# Patient Record
Sex: MALE | Race: WHITE | NOT HISPANIC OR LATINO | URBAN - METROPOLITAN AREA
[De-identification: names, ages, dates, MRNs, and addresses within clinical notes are randomized per-mention and may not be internally consistent; named-entity substitution may affect disease eponyms.]

---

## 2018-05-28 ENCOUNTER — EMERGENCY (EMERGENCY)
Facility: HOSPITAL | Age: 28
LOS: 1 days | Discharge: HOME | End: 2018-05-28
Attending: EMERGENCY MEDICINE
Payer: MEDICAID

## 2018-05-28 VITALS
OXYGEN SATURATION: 99 % | DIASTOLIC BLOOD PRESSURE: 87 MMHG | SYSTOLIC BLOOD PRESSURE: 129 MMHG | RESPIRATION RATE: 18 BRPM | HEART RATE: 78 BPM

## 2018-05-28 VITALS
TEMPERATURE: 98 F | OXYGEN SATURATION: 97 % | SYSTOLIC BLOOD PRESSURE: 117 MMHG | RESPIRATION RATE: 18 BRPM | DIASTOLIC BLOOD PRESSURE: 76 MMHG | HEART RATE: 72 BPM

## 2018-05-28 PROCEDURE — 93308 TTE F-UP OR LMTD: CPT

## 2018-05-28 PROCEDURE — 71046 X-RAY EXAM CHEST 2 VIEWS: CPT | Mod: 26

## 2018-05-28 PROCEDURE — 94640 AIRWAY INHALATION TREATMENT: CPT

## 2018-05-28 PROCEDURE — 99285 EMERGENCY DEPT VISIT HI MDM: CPT | Mod: 25

## 2018-05-28 PROCEDURE — 93010 ELECTROCARDIOGRAM REPORT: CPT

## 2018-05-28 PROCEDURE — 99284 EMERGENCY DEPT VISIT MOD MDM: CPT | Mod: 25

## 2018-05-28 PROCEDURE — 93005 ELECTROCARDIOGRAM TRACING: CPT

## 2018-05-28 PROCEDURE — 71046 X-RAY EXAM CHEST 2 VIEWS: CPT

## 2018-05-28 RX ORDER — AMOXICILLIN 250 MG/5ML
0 SUSPENSION, RECONSTITUTED, ORAL (ML) ORAL
Qty: 0 | Refills: 0 | COMMUNITY

## 2018-05-28 RX ORDER — IBUPROFEN 200 MG
600 TABLET ORAL ONCE
Qty: 0 | Refills: 0 | Status: COMPLETED | OUTPATIENT
Start: 2018-05-28 | End: 2018-05-28

## 2018-05-28 RX ORDER — IPRATROPIUM/ALBUTEROL SULFATE 18-103MCG
3 AEROSOL WITH ADAPTER (GRAM) INHALATION ONCE
Qty: 0 | Refills: 0 | Status: COMPLETED | OUTPATIENT
Start: 2018-05-28 | End: 2018-05-28

## 2018-05-28 RX ADMIN — Medication 600 MILLIGRAM(S): at 20:52

## 2018-05-28 RX ADMIN — Medication 3 MILLILITER(S): at 21:57

## 2018-05-28 RX ADMIN — Medication 200 MILLIGRAM(S): at 22:27

## 2018-05-28 NOTE — ED ADULT NURSE NOTE - CHPI ED SYMPTOMS NEG
no diaphoresis/no body aches/no chills/no headache/no wheezing/no hemoptysis/no shortness of breath/no edema/no fever

## 2018-05-28 NOTE — ED PROVIDER NOTE - OBJECTIVE STATEMENT
26 y/o male no pmhx who presents to the ED for Chest pain. patient reports cough and cold symptoms starting about 10 days ago, went to  and was started on zpack, wasn't feeling improved so at the end of the course returned and was placed on amoxicillin. Patient reports continued congestion and productive cough with clear phlegm. no fever/chills. no recent travel or sick contacts. today developed bilaterl anterior chest pain medially, chest ttp and reports SOB because it is painful to breath

## 2018-05-28 NOTE — ED PROVIDER NOTE - CARDIAC, MLM
Normal rate, regular rhythm.  Heart sounds S1, S2.  No murmurs, rubs or gallops. anterior chest wall ttp over intercostals

## 2018-05-28 NOTE — ED PROVIDER NOTE - ATTENDING CONTRIBUTION TO CARE
28 yo M presents with 1 week of congestion and cough. initially productive of green phlegm was placed on a zpack with improvement of his cough. cough recurred on saturday, productive of white phlegm. he was started on augmentin by his PMD. today he started developing burning/sharp chest wall pain with coughing. and sob, rated 5/10 at this time. cp is not pleuritic. cp/sob not exertional or positional (lying down/sitting up doesn't influence pain). no orthopnea/pnd, no leg swelling, no abd pain.   PE lungs CTA. anteiror chest wall over sternum and chrostochonral cartilages is TTP. 28 yo M presents with 1 week of congestion and cough. initially productive of green phlegm was placed on a zpack with improvement of his cough. cough recurred on saturday, productive of white phlegm. he was started on augmentin by his PMD. today he started developing burning/sharp chest wall pain with coughing. and sob, rated 5/10 at this time. cp is not pleuritic. cp/sob not exertional or positional (lying down/sitting up doesn't influence pain). no orthopnea/pnd, no leg swelling, no abd pain. no PE risk factors. no ACS risk factors.  PE lungs CTA. anteiror chest wall over sternum and chrostochonral cartilages is TTP.  VS stable. PERC negative.   ed workup with EKG with nonspecific changes, no stemi, no pericarditis. CXR With NAD. Echo with no effusion. patient treated with 1 duoneb and toradol in the ER. on pt re-eval he reported significant improvement in his symptoms and resolution of sob. he was observed ambulating around the ER without any sob. pt discharged with instructions to rest, motrin/tylenol for pain, and f/u with pmd in 1-2 days for repeat evla/further management    The patient was discharged from the ED in stable condition. All results of today's workup were discussed with the patient and all questions/concerns were addressed. All discharge instructions were thoroughly discussed with the patient, as well as important warning signs and new/ worsening symptoms which should necessitate patient's immediate return to the ED. The patient is agreeable with discharge and expresses full understanding of all instructions given.

## 2018-05-28 NOTE — ED PROVIDER NOTE - PLAN OF CARE
Follow up with your Primary Care Physician within the next 2-3 days  Bring a copy of your test results with you to your appointment  Continue your current medication regimen  Return to the Emergency Room if you experience new or worsening symptoms  stay hydrated  Take Motrin 400-600mg every 6 hrs as needed for pain. Take with food  Take Tessalon Perles 3x per day as needed for cough

## 2018-05-28 NOTE — ED PROVIDER NOTE - CARE PLAN
Principal Discharge DX:	URI (upper respiratory infection)  Assessment and plan of treatment:	Follow up with your Primary Care Physician within the next 2-3 days  Bring a copy of your test results with you to your appointment  Continue your current medication regimen  Return to the Emergency Room if you experience new or worsening symptoms  stay hydrated  Take Motrin 400-600mg every 6 hrs as needed for pain. Take with food  Take Tessalon Perles 3x per day as needed for cough

## 2018-05-28 NOTE — ED PROVIDER NOTE - PROGRESS NOTE DETAILS
patient reports no improvement with the duoneb. will obtain echo to rule out pericardial effusion. patient well  appearing and with no increased work of breathing.

## 2018-05-28 NOTE — ED ADULT NURSE NOTE - OBJECTIVE STATEMENT
pt is a 27 yr M presents with cough and chest pain since x 10 days. pt was started on z-pack last week without relief. pt was started on amoxicillin Saturday. cough is non-productive, chest pain is midsternal, reproducible. no fever/chills/sob. VSS.

## 2018-12-04 ENCOUNTER — EMERGENCY (EMERGENCY)
Facility: HOSPITAL | Age: 28
LOS: 1 days | Discharge: HOME | End: 2018-12-04
Attending: EMERGENCY MEDICINE | Admitting: EMERGENCY MEDICINE
Payer: MEDICAID

## 2018-12-04 VITALS
HEART RATE: 67 BPM | DIASTOLIC BLOOD PRESSURE: 73 MMHG | TEMPERATURE: 98 F | OXYGEN SATURATION: 100 % | SYSTOLIC BLOOD PRESSURE: 130 MMHG | RESPIRATION RATE: 16 BRPM

## 2018-12-04 PROCEDURE — 99283 EMERGENCY DEPT VISIT LOW MDM: CPT

## 2018-12-04 NOTE — ED ADULT TRIAGE NOTE - CHIEF COMPLAINT QUOTE
Pt. w/ hx. removal cyst on buttock a few years ago c/o puss draining from umbilicus. Pt. states he noticed foul smell from umbilicus, and puss is only noted when abdomen is squeezed. Pt. appears in NAD , denies any abdominal pain. No redness noted .

## 2018-12-05 RX ORDER — AZTREONAM 2 G
1 VIAL (EA) INJECTION
Qty: 9 | Refills: 0 | OUTPATIENT
Start: 2018-12-05 | End: 2018-12-09

## 2018-12-05 RX ADMIN — Medication 1 TABLET(S): at 01:52

## 2018-12-05 NOTE — ED PROVIDER NOTE - ATTENDING CONTRIBUTION TO CARE
I performed a face-to-face evaluation of the patient and performed a history and physical examination. I agree with the history and physical examination.    Marika: Young man, 2nd episode of umbilicus cellulitis w/ pus. No e/o abscess. Consider persistent urachus. Bactrim. Refer to urology.

## 2018-12-05 NOTE — ED PROVIDER NOTE - NSFOLLOWUPINSTRUCTIONS_ED_ALL_ED_FT
See your primary care doctor within 24-48 hours. Follow up with a urology for evaluation of a possible  persistent urachus or further evaluation, bring copies of all reports with you. Take Bactrim (antibiotic) 1 tab twice a day for 5 days. Return to the ER for worsening symptoms or any other concerns.

## 2018-12-05 NOTE — ED PROVIDER NOTE - MEDICAL DECISION MAKING DETAILS
Marika: Young man, 2nd episode of umbilicus cellulitis w/ pus. No e/o abscess. Consider persistent urachus. Bactrim. Refer to urology.

## 2018-12-05 NOTE — ED PROVIDER NOTE - OBJECTIVE STATEMENT
29 y/o M with no significant PMHx presents to ED with c/o umbilical pus today. Pt states that he woke up to foul odor coming from umbilical area. Soon after pt proceeded to squeeze it and pus drained from umbilicus. Pt reported tenderness earlier, but no pain. Pt has a history of abscess, with the last one being pyelo nephro. Pt denies any fever, chills , vomiting, or anorexia. 29 y/o M with no significant PMHx presents to ED with c/o pus coming out of his umbilicus today. Pt states that he woke up to foul odor coming from umbilical area. Soon after pt proceeded to squeeze it and pus drained from umbilicus. Pt reported tenderness earlier, but none now. Pt has a history of abscesses, with the last one being a pilonidal. Pt denies any fever, chills , vomiting, or anorexia.

## 2018-12-05 NOTE — ED ADULT NURSE NOTE - OBJECTIVE STATEMENT
Pt A&Ox3, pt reports no pain or discomfort at this time. ABD soft and non-tender. No pus or drainage noted from umbilicus at this time. Pt appears in NAD.

## 2018-12-05 NOTE — ED ADULT NURSE NOTE - NSIMPLEMENTINTERV_GEN_ALL_ED
Implemented All Universal Safety Interventions:  [place] to call system. Call bell, personal items and telephone within reach. Instruct patient to call for assistance. Room bathroom lighting operational. Non-slip footwear when patient is off stretcher. Physically safe environment: no spills, clutter or unnecessary equipment. Stretcher in lowest position, wheels locked, appropriate side rails in place.

## 2018-12-05 NOTE — ED PROVIDER NOTE - PHYSICAL EXAMINATION
Skin: Mild erythema to 11 o clock position of inner umbilicus. Skin: Mild erythema to 11 o clock position of inner umbilicus, no palpable tenderness/fluctuance  or mass, no active drainage from umbilicus

## 2019-08-23 ENCOUNTER — TRANSCRIPTION ENCOUNTER (OUTPATIENT)
Age: 29
End: 2019-08-23

## 2019-08-24 ENCOUNTER — EMERGENCY (EMERGENCY)
Facility: HOSPITAL | Age: 29
LOS: 1 days | Discharge: HOME | End: 2019-08-24
Admitting: EMERGENCY MEDICINE
Payer: MEDICAID

## 2019-08-24 ENCOUNTER — TRANSCRIPTION ENCOUNTER (OUTPATIENT)
Age: 29
End: 2019-08-24

## 2019-08-24 VITALS
OXYGEN SATURATION: 100 % | RESPIRATION RATE: 18 BRPM | SYSTOLIC BLOOD PRESSURE: 127 MMHG | HEART RATE: 72 BPM | TEMPERATURE: 98 F | DIASTOLIC BLOOD PRESSURE: 73 MMHG

## 2019-08-24 DIAGNOSIS — Z90.49 ACQUIRED ABSENCE OF OTHER SPECIFIED PARTS OF DIGESTIVE TRACT: Chronic | ICD-10-CM

## 2019-08-24 LAB
ALBUMIN SERPL ELPH-MCNC: 4.1 G/DL — SIGNIFICANT CHANGE UP (ref 3.3–5)
ALP SERPL-CCNC: 40 U/L — SIGNIFICANT CHANGE UP (ref 40–120)
ALT FLD-CCNC: 13 U/L — SIGNIFICANT CHANGE UP (ref 4–41)
ANION GAP SERPL CALC-SCNC: 11 MMO/L — SIGNIFICANT CHANGE UP (ref 7–14)
APTT BLD: 28.8 SEC — SIGNIFICANT CHANGE UP (ref 27.5–36.3)
AST SERPL-CCNC: 18 U/L — SIGNIFICANT CHANGE UP (ref 4–40)
BASOPHILS # BLD AUTO: 0.04 K/UL — SIGNIFICANT CHANGE UP (ref 0–0.2)
BASOPHILS NFR BLD AUTO: 0.6 % — SIGNIFICANT CHANGE UP (ref 0–2)
BILIRUB SERPL-MCNC: 0.4 MG/DL — SIGNIFICANT CHANGE UP (ref 0.2–1.2)
BUN SERPL-MCNC: 15 MG/DL — SIGNIFICANT CHANGE UP (ref 7–23)
CALCIUM SERPL-MCNC: 8.8 MG/DL — SIGNIFICANT CHANGE UP (ref 8.4–10.5)
CHLORIDE SERPL-SCNC: 103 MMOL/L — SIGNIFICANT CHANGE UP (ref 98–107)
CO2 SERPL-SCNC: 25 MMOL/L — SIGNIFICANT CHANGE UP (ref 22–31)
CREAT SERPL-MCNC: 0.91 MG/DL — SIGNIFICANT CHANGE UP (ref 0.5–1.3)
EOSINOPHIL # BLD AUTO: 0.08 K/UL — SIGNIFICANT CHANGE UP (ref 0–0.5)
EOSINOPHIL NFR BLD AUTO: 1.3 % — SIGNIFICANT CHANGE UP (ref 0–6)
GLUCOSE SERPL-MCNC: 82 MG/DL — SIGNIFICANT CHANGE UP (ref 70–99)
HCT VFR BLD CALC: 41.6 % — SIGNIFICANT CHANGE UP (ref 39–50)
HGB BLD-MCNC: 13.6 G/DL — SIGNIFICANT CHANGE UP (ref 13–17)
IMM GRANULOCYTES NFR BLD AUTO: 0.3 % — SIGNIFICANT CHANGE UP (ref 0–1.5)
INR BLD: 1.16 — SIGNIFICANT CHANGE UP (ref 0.88–1.17)
LYMPHOCYTES # BLD AUTO: 1.5 K/UL — SIGNIFICANT CHANGE UP (ref 1–3.3)
LYMPHOCYTES # BLD AUTO: 24 % — SIGNIFICANT CHANGE UP (ref 13–44)
MCHC RBC-ENTMCNC: 27.2 PG — SIGNIFICANT CHANGE UP (ref 27–34)
MCHC RBC-ENTMCNC: 32.7 % — SIGNIFICANT CHANGE UP (ref 32–36)
MCV RBC AUTO: 83.2 FL — SIGNIFICANT CHANGE UP (ref 80–100)
MONOCYTES # BLD AUTO: 0.51 K/UL — SIGNIFICANT CHANGE UP (ref 0–0.9)
MONOCYTES NFR BLD AUTO: 8.2 % — SIGNIFICANT CHANGE UP (ref 2–14)
NEUTROPHILS # BLD AUTO: 4.1 K/UL — SIGNIFICANT CHANGE UP (ref 1.8–7.4)
NEUTROPHILS NFR BLD AUTO: 65.6 % — SIGNIFICANT CHANGE UP (ref 43–77)
NRBC # FLD: 0 K/UL — SIGNIFICANT CHANGE UP (ref 0–0)
PLATELET # BLD AUTO: 144 K/UL — LOW (ref 150–400)
PMV BLD: 10.3 FL — SIGNIFICANT CHANGE UP (ref 7–13)
POTASSIUM SERPL-MCNC: 4.4 MMOL/L — SIGNIFICANT CHANGE UP (ref 3.5–5.3)
POTASSIUM SERPL-SCNC: 4.4 MMOL/L — SIGNIFICANT CHANGE UP (ref 3.5–5.3)
PROT SERPL-MCNC: 6.6 G/DL — SIGNIFICANT CHANGE UP (ref 6–8.3)
PROTHROM AB SERPL-ACNC: 13.3 SEC — HIGH (ref 9.8–13.1)
RBC # BLD: 5 M/UL — SIGNIFICANT CHANGE UP (ref 4.2–5.8)
RBC # FLD: 12.8 % — SIGNIFICANT CHANGE UP (ref 10.3–14.5)
SODIUM SERPL-SCNC: 139 MMOL/L — SIGNIFICANT CHANGE UP (ref 135–145)
WBC # BLD: 6.25 K/UL — SIGNIFICANT CHANGE UP (ref 3.8–10.5)
WBC # FLD AUTO: 6.25 K/UL — SIGNIFICANT CHANGE UP (ref 3.8–10.5)

## 2019-08-24 PROCEDURE — 99283 EMERGENCY DEPT VISIT LOW MDM: CPT

## 2019-08-24 PROCEDURE — 73080 X-RAY EXAM OF ELBOW: CPT | Mod: 26,LT

## 2019-08-24 RX ORDER — ACETAMINOPHEN 500 MG
650 TABLET ORAL ONCE
Refills: 0 | Status: COMPLETED | OUTPATIENT
Start: 2019-08-24 | End: 2019-08-24

## 2019-08-24 RX ADMIN — Medication 650 MILLIGRAM(S): at 20:13

## 2019-08-24 NOTE — ED ADULT TRIAGE NOTE - CHIEF COMPLAINT QUOTE
pt has pain to left elbow/ went to urgi and told possible cellulitis/ pts elbow slightly swollen no redness noted

## 2019-08-24 NOTE — ED PROVIDER NOTE - CLINICAL SUMMARY MEDICAL DECISION MAKING FREE TEXT BOX
Pt is a 27 y/o M nonsmoker PMHx appendectomy, pilonidal cyst surgery p/w left elbow pain today -- possible elbow/forearm strain, not clinically concerning for cellulitis -- labs, tylenol, xray elbow

## 2019-08-24 NOTE — ED PROVIDER NOTE - PROGRESS NOTE DETAILS
PA MOSES:  Swelling unchanged.  Pt notes mild improvement in pain.  XR, labs unremarkable.  Pt medically stable for discharge. Pt to follow up with PMD and orthopedics (referral list provided).

## 2019-08-24 NOTE — ED PROVIDER NOTE - NSFOLLOWUPINSTRUCTIONS_ED_ALL_ED_FT
Advance activity as tolerated.  Continue all previously prescribed medications as directed unless otherwise instructed.  Take Tylenol 650mg (Two 325 mg pills) every 4-6 hours as needed for pain or fevers. Apply cool compresses for 15 minutes to affected area, 3-4 times per day. Keep extremity elevated and wrapped.  Apply cool compresses to affected area for 15 minutes, 3-4 times per day.  Follow up with your primary care physician and orthopedics (referral list provided) in 48-72 hours- bring copies of your results.  Return to the ER for worsening or persistent symptoms, including but not limited to worsening/persistent pain, swelling, redness, fevers, inability to bend at elbow, weakness, and/or ANY NEW OR CONCERNING SYMPTOMS. If you have issues obtaining follow up, please call: [phone] to obtain a doctor or specialist who takes your insurance in your area.  You may call [phone] to make an appointment with the internal medicine clinic.

## 2019-08-24 NOTE — ED PROVIDER NOTE - UPPER EXTREMITY EXAM, LEFT
pain with ROM; no pain with axial loading; no redness; no warmth; + swelling and tenderness at dorsolateral aspect of proximal forearm and elbow with appearance of bruising; no crepitus; upper extremity NVI; 2+ radial pulse, < 2 sec capillary refill, sensation intact to light touch; 5/5 strength

## 2019-08-24 NOTE — ED PROVIDER NOTE - OBJECTIVE STATEMENT
Pt is a 29 y/o M nonsmoker PMHx appendectomy, pilonidal cyst surgery p/w left elbow pain today.  Pt states 1 hour after lifting heavy objects, pt developed pain and swelling at dorsolateral aspect of proximal forearm and elbow.  Pt notes pain occurs with ROM and with palpation.  Pt noted gradual swelling, which has been stable for past few hours.  Pt went to urgent care where he was told he had cellulitis and was told to go to ED if he develops redness or worsening pain.  Pt states examiner at urgent care was rough with physical exam and exacerbated, so pt went to ED at this time.  Pt states he is on Amoxicillin for "viral illness" he had yesterday (prescribed by urgent care).  Pt states today viral syndrome resolved, but yesterday he had dry cough, sore throat and he felt tired.  Pt denies any fevers, chills, nausea, vomiting, numbness, weakness, trauma, inability to range at elbow, redness, known insect bites, h/o joint pains elsewhere, h/o STDs (pt states he has never been sexually active), h/o lyme disease, known tick bites, illicit drug use, or any other specific complaints.

## 2020-01-19 ENCOUNTER — TRANSCRIPTION ENCOUNTER (OUTPATIENT)
Age: 30
End: 2020-01-19

## 2021-06-10 NOTE — ED PROVIDER NOTE - DISCHARGE DATE
Otolaryngologist Procedure Text (A): After obtaining clear surgical margins the patient was sent to otolaryngology for surgical repair.  The patient understands they will receive post-surgical care and follow-up from the referring physician's office. 24-Aug-2019

## 2021-12-20 ENCOUNTER — EMERGENCY (EMERGENCY)
Facility: HOSPITAL | Age: 31
LOS: 1 days | Discharge: HOME | End: 2021-12-20
Attending: HOSPITALIST | Admitting: HOSPITALIST
Payer: MEDICAID

## 2021-12-20 VITALS
HEART RATE: 74 BPM | RESPIRATION RATE: 16 BRPM | SYSTOLIC BLOOD PRESSURE: 129 MMHG | TEMPERATURE: 99 F | OXYGEN SATURATION: 100 % | DIASTOLIC BLOOD PRESSURE: 77 MMHG

## 2021-12-20 DIAGNOSIS — Z90.49 ACQUIRED ABSENCE OF OTHER SPECIFIED PARTS OF DIGESTIVE TRACT: Chronic | ICD-10-CM

## 2021-12-20 PROCEDURE — 99284 EMERGENCY DEPT VISIT MOD MDM: CPT

## 2021-12-20 NOTE — ED PROVIDER NOTE - OBJECTIVE STATEMENT
31M p/w covid-like sx, requesting covid swab. + sore throat since last night and cough today with some chest soreness when he coughs and body aches. no fever or sob. patient is vaccinated.

## 2021-12-20 NOTE — ED PROVIDER NOTE - NSFOLLOWUPINSTRUCTIONS_ED_ALL_ED_FT
You will be texted at the number provided with the results of you covid swab  Quarantine as per CDC guidelines.

## 2021-12-20 NOTE — ED PROVIDER NOTE - PATIENT PORTAL LINK FT
You can access the FollowMyHealth Patient Portal offered by [facility] by registering at the following website: [email or web address]. By joining [email or web address]’s FollowMyHealth portal, you will also be able to view your health information using other applications (apps) compatible with our system.

## 2021-12-21 LAB — SARS-COV-2 RNA SPEC QL NAA+PROBE: SIGNIFICANT CHANGE UP

## 2022-12-06 ENCOUNTER — NON-APPOINTMENT (OUTPATIENT)
Age: 32
End: 2022-12-06

## 2022-12-09 ENCOUNTER — NON-APPOINTMENT (OUTPATIENT)
Age: 32
End: 2022-12-09

## 2023-06-29 ENCOUNTER — NON-APPOINTMENT (OUTPATIENT)
Age: 33
End: 2023-06-29

## 2023-08-10 ENCOUNTER — NON-APPOINTMENT (OUTPATIENT)
Age: 33
End: 2023-08-10

## 2023-08-22 ENCOUNTER — NON-APPOINTMENT (OUTPATIENT)
Age: 33
End: 2023-08-22

## 2023-08-23 ENCOUNTER — APPOINTMENT (OUTPATIENT)
Dept: GASTROENTEROLOGY | Facility: CLINIC | Age: 33
End: 2023-08-23

## 2023-09-21 ENCOUNTER — NON-APPOINTMENT (OUTPATIENT)
Age: 33
End: 2023-09-21

## 2023-09-23 ENCOUNTER — NON-APPOINTMENT (OUTPATIENT)
Age: 33
End: 2023-09-23